# Patient Record
Sex: FEMALE
[De-identification: names, ages, dates, MRNs, and addresses within clinical notes are randomized per-mention and may not be internally consistent; named-entity substitution may affect disease eponyms.]

---

## 2021-05-22 ENCOUNTER — APPOINTMENT (OUTPATIENT)
Dept: DISASTER EMERGENCY | Facility: OTHER | Age: 46
End: 2021-05-22

## 2022-06-09 PROBLEM — Z00.00 ENCOUNTER FOR PREVENTIVE HEALTH EXAMINATION: Status: ACTIVE | Noted: 2022-06-09

## 2022-06-10 ENCOUNTER — APPOINTMENT (OUTPATIENT)
Dept: ORTHOPEDIC SURGERY | Facility: CLINIC | Age: 47
End: 2022-06-10
Payer: COMMERCIAL

## 2022-06-10 ENCOUNTER — NON-APPOINTMENT (OUTPATIENT)
Age: 47
End: 2022-06-10

## 2022-06-10 PROCEDURE — 73610 X-RAY EXAM OF ANKLE: CPT | Mod: RT

## 2022-06-10 PROCEDURE — 99204 OFFICE O/P NEW MOD 45 MIN: CPT

## 2022-06-10 NOTE — HISTORY OF PRESENT ILLNESS
[FreeTextEntry1] : The patient is a 47 year old female presenting for an initial evaluation of right ankle pain x three months. The patient cannot attribute their pain to any injury, fall, or trauma. The patient is very active in [facility] for forty years, reporting pain with activity. She reports concerns of pain and swelling. She states that she is unable to run without severe pain to her ankle. Pain is localized over the lateral aspect of her ankle. Over the past three months, the patient has performed a home exercise program which consisted of strengthening and stretching, as well as of oral anti-inflammatory use and Tylenol without relief. She does not currently take oral anti-inflammatories. The patient is here today after failing conservative management. She denies any numbness or tingling sensations in their foot or ankle. She presents wearing sneakers and is walking without assistance. Pain scale 4 out of 10. No other complaints.  \par \par \par

## 2022-06-10 NOTE — ADDENDUM
[FreeTextEntry1] : I, Phyllis Sunshine, acted solely as a scribe for Dr. Nick Carey on this date 06/10/2022.\par \par All medical record entries made by the Scribe were at my, Dr. Nick Carey, direction and personally dictated by me on 06/10/2022 . I have reviewed the chart and agree that the record accurately reflects my personal performance of the history, physical exam, assessment and plan. I have also personally directed, reviewed, and agreed with the chart.	\par

## 2022-06-10 NOTE — PHYSICAL EXAM
[de-identified] : General: Alert and oriented x3. In no acute distress. Pleasant in nature with a normal affect. No apparent respiratory distress.\par \par Right Ankle Exam\par Skin: Clean, dry, intact\par Inspection: No obvious malalignment, no swelling, no effusion; no lymphadenopathy\par Pulses: 2+ DP/PT pulses\par ROM: 10 degrees of dorsiflexion, 40 degrees of plantarflexion, 10 degrees of subtalar motion\par Tenderness: Tenderness to the posterior tibial tendon. No tenderness over the lateral malleolus, + CFL/ATFL/PTFL pain. No medial malleolus pain, no deltoid ligament pain. No proximal fibular pain. No heel pain.\par Stability: Negative anterior/posterior drawer.\par Strength: 5/5 TA/GS/EHL\par Neuro: In tact to light touch throughout\par Additional tests: Negative Mortons test, Negative syndesmosis squeeze test. [de-identified] : 3V of the right ankle were ordered, obtained, and reviewed by me today, 06/10/2022, revealed: No acute fractures or bone abnormalities. \par

## 2022-06-10 NOTE — DISCUSSION/SUMMARY
[de-identified] : Today I had a lengthy discussion with the patient regarding their right ankle pain. I have addressed all the patient's concerns surrounding the pathology of their condition. XR imaging was completed in office today and results were reviewed with the patient. At this time I would like to obtain advanced imaging of the patient's right ankle.  An MRI was ordered so I can find out more about the etiology of the patient's condition. The patient should follow up with the office after obtaining the MRI. I recommend that the patient utilize Voltaren gel topically. If the Voltaren gel could not be obtained, Icy Hot, Biofreeze, or Bengay can be utilized instead. No restrictions at this time. The patient understood and verbally agreed to the treatment plan. All of their questions were answered and they were satisfied with the visit. The patient should call the office if they have any questions or experience worsening symptoms.

## 2022-06-17 ENCOUNTER — OUTPATIENT (OUTPATIENT)
Dept: OUTPATIENT SERVICES | Facility: HOSPITAL | Age: 47
LOS: 1 days | End: 2022-06-17
Payer: COMMERCIAL

## 2022-06-17 ENCOUNTER — APPOINTMENT (OUTPATIENT)
Dept: MRI IMAGING | Facility: CLINIC | Age: 47
End: 2022-06-17
Payer: COMMERCIAL

## 2022-06-17 DIAGNOSIS — M25.571 PAIN IN RIGHT ANKLE AND JOINTS OF RIGHT FOOT: ICD-10-CM

## 2022-06-17 DIAGNOSIS — S66.911A STRAIN OF UNSPECIFIED MUSCLE, FASCIA AND TENDON AT WRIST AND HAND LEVEL, RIGHT HAND, INITIAL ENCOUNTER: ICD-10-CM

## 2022-06-17 PROCEDURE — 73721 MRI JNT OF LWR EXTRE W/O DYE: CPT | Mod: 26,RT

## 2022-06-17 PROCEDURE — 73721 MRI JNT OF LWR EXTRE W/O DYE: CPT

## 2022-06-27 ENCOUNTER — APPOINTMENT (OUTPATIENT)
Dept: ORTHOPEDIC SURGERY | Facility: CLINIC | Age: 47
End: 2022-06-27

## 2022-06-27 DIAGNOSIS — S99.911A UNSPECIFIED INJURY OF RIGHT ANKLE, INITIAL ENCOUNTER: ICD-10-CM

## 2022-06-27 DIAGNOSIS — S86.311D STRAIN OF MUSCLE(S) AND TENDON(S) OF PERONEAL MUSCLE GROUP AT LOWER LEG LEVEL, RIGHT LEG, SUBSEQUENT ENCOUNTER: ICD-10-CM

## 2022-06-27 DIAGNOSIS — S93.401A SPRAIN OF UNSPECIFIED LIGAMENT OF RIGHT ANKLE, INITIAL ENCOUNTER: ICD-10-CM

## 2022-06-27 DIAGNOSIS — M25.571 PAIN IN RIGHT ANKLE AND JOINTS OF RIGHT FOOT: ICD-10-CM

## 2022-06-27 PROCEDURE — 99214 OFFICE O/P EST MOD 30 MIN: CPT

## 2022-06-27 NOTE — PHYSICAL EXAM
[de-identified] : General: Alert and oriented x3. In no acute distress. Pleasant in nature with a normal affect. No apparent respiratory distress.\par \par Right Ankle Exam\par Skin: Clean, dry, intact\par Inspection: No obvious malalignment, no swelling, no effusion; no lymphadenopathy\par Pulses: 2+ DP/PT pulses\par ROM: 10 degrees of dorsiflexion, 40 degrees of plantarflexion, 10 degrees of subtalar motion\par Tenderness: Tenderness to the posterior tibial tendon. No tenderness over the lateral malleolus, + CFL/ATFL/PTFL pain. No medial malleolus pain, no deltoid ligament pain. No proximal fibular pain. No heel pain.\par Stability: Negative anterior/posterior drawer.\par Strength: 5/5 TA/GS/EHL\par Neuro: In tact to light touch throughout\par Additional tests: Negative Mortons test, Negative syndesmosis squeeze test. [de-identified] :  EXAM: [id number] - MR ANKLE RT  - ORDERED BY: MARINO DE JESUS\par \par \par PROCEDURE DATE:  06/17/2022\par \par \par \par INTERPRETATION:  EXAMINATION: MR ANKLE RIGHT\par \par CLINICAL INDICATION:Right ankle pain. Evaluate for lateral ligament tearing.\par \par COMPARISON: None.\par \par TECHNIQUE: Multiplanar, multi-sequence MRI of the right ankle was performed without intravenous contrast.\par \par INTERPRETATION:\par \par Localizer: No additional findings.\par \par Joint space: There are large tibiotalar and subtalar effusions.\par \par Bones and articular cartilage: There is notable bone marrow edema tibial plafond and centrally and anteriorly. There is mild overlying cartilage irregularity.\par \par Tendons and bursae: Partial focal split tear peroneus brevis tendon is present at the lateral malleolus with distal reconstitution. There is trace peroneal tendon sheath tenosynovitis. There is tendinosis of the tibialis posterior tendon with trace tenosynovitis. The flexor, extensor, peroneal, and Achilles tendons are otherwise intact.  There is moderate retrocalcaneal bursitis.\par \par Ligaments: There is T2 hyperintense signal abnormality involving the ATFL (anterior talofibular ligament) and CFL (calcaneofibular ligament), with mild fiber irregularity, consistent with sprain and partial tear. There is adjacent soft tissue edema suggesting this is acute. The tibiofibular, posterior talofibular, deltoid, and spring ligaments are intact.\par \par Plantar fascia: The plantar fascia is normal in signal and thickness.\par \par \par \par IMPRESSION:\par 1.  Acute sprains with low-grade partial tearing of ATFL and CFL.\par \par 2.  Extensive marrow contusion of the tibial plafond.\par \par 3.  Focal split tear of the peroneus brevis tendon at the level of the lateral malleolus with distal reconstitution. Mild tibialis posterior tenosynovitis and tendinosis. Moderate retrocalcaneal bursitis.\par \par 4.  Large tibiotalar/subtalar effusions.\par \par --- End of Report ---\par \par \par \par \par \par \par SHLOMIT A GOLDBERG-BRICENO MD; Attending Radiologist\par This document has been electronically signed. Jun 17 2022 10:47AM\par

## 2022-06-27 NOTE — HISTORY OF PRESENT ILLNESS
[FreeTextEntry1] : 6/27/2022: The patient returns to the office to review MRI results of the right ankle. No changes since the previous office visit. She is wearing sneakers and is walking without assistance. No other complaints. \par \par 6/10/2022: The patient is a 47 year old female presenting for an initial evaluation of right ankle pain x three months. The patient cannot attribute their pain to any injury, fall, or trauma. The patient is very active in [facility] for forty years, reporting pain with activity. She reports concerns of pain and swelling. She states that she is unable to run without severe pain to her ankle. Pain is localized over the lateral aspect of her ankle. Over the past three months, the patient has performed a home exercise program which consisted of strengthening and stretching, as well as of oral anti-inflammatory use and Tylenol without relief. She does not currently take oral anti-inflammatories. The patient is here today after failing conservative management. She denies any numbness or tingling sensations in their foot or ankle. She presents wearing sneakers and is walking without assistance. Pain scale 4 out of 10. No other complaints.  \par \par \par

## 2022-06-27 NOTE — DISCUSSION/SUMMARY
[de-identified] : Today I had a lengthy discussion with the patient regarding their right ankle pain. I have addressed all the patient's concerns surrounding the pathology of their condition. MRI results were reviewed with the patient today in the office. I recommend the patient undergo a course of physical therapy for the right ankle  2-3 times a week for a total of 8-12 weeks. A prescription was given for the physical therapy today. A discussion was had in regards to activity modifications, with advisement to the patient that she may ease in her activities as tolerated. The patient understood and verbally agreed to the treatment plan. All of their questions were answered and they were satisfied with the visit. The patient should call the office if they have any questions or experience worsening symptoms. I would like to see the patient back in the office in PRN to reassess their condition. 				\par

## 2022-06-27 NOTE — ADDENDUM
[FreeTextEntry1] : I, Phyllis Sunshine, acted solely as a scribe for Dr. Nick Carey on this date 06/27/2022.\par \par All medical record entries made by the Scribe were at my, Dr. Nick Carey, direction and personally dictated by me on 06/27/2022 . I have reviewed the chart and agree that the record accurately reflects my personal performance of the history, physical exam, assessment and plan. I have also personally directed, reviewed, and agreed with the chart.	\par